# Patient Record
Sex: FEMALE | Race: WHITE | Employment: OTHER | URBAN - METROPOLITAN AREA
[De-identification: names, ages, dates, MRNs, and addresses within clinical notes are randomized per-mention and may not be internally consistent; named-entity substitution may affect disease eponyms.]

---

## 2017-01-01 ENCOUNTER — TELEPHONE (OUTPATIENT)
Dept: FAMILY MEDICINE | Facility: CLINIC | Age: 82
End: 2017-01-01

## 2017-01-01 ENCOUNTER — TRANSFERRED RECORDS (OUTPATIENT)
Dept: HEALTH INFORMATION MANAGEMENT | Facility: CLINIC | Age: 82
End: 2017-01-01

## 2017-01-01 ENCOUNTER — ANTICOAGULATION THERAPY VISIT (OUTPATIENT)
Dept: FAMILY MEDICINE | Facility: CLINIC | Age: 82
End: 2017-01-01

## 2017-01-01 DIAGNOSIS — D68.9 COAGULATION DEFECT (H): Primary | ICD-10-CM

## 2017-01-01 DIAGNOSIS — I74.9 EMBOLISM AND THROMBOSIS (H): ICD-10-CM

## 2017-01-01 DIAGNOSIS — G30.9 ALZHEIMER'S DEMENTIA WITHOUT BEHAVIORAL DISTURBANCE, UNSPECIFIED TIMING OF DEMENTIA ONSET: Primary | ICD-10-CM

## 2017-01-01 DIAGNOSIS — F02.80 ALZHEIMER'S DEMENTIA WITHOUT BEHAVIORAL DISTURBANCE, UNSPECIFIED TIMING OF DEMENTIA ONSET: Primary | ICD-10-CM

## 2017-01-01 DIAGNOSIS — G93.40 ENCEPHALOPATHY ACUTE: ICD-10-CM

## 2017-01-01 DIAGNOSIS — Z79.01 LONG-TERM (CURRENT) USE OF ANTICOAGULANTS: ICD-10-CM

## 2017-01-01 RX ORDER — TRAZODONE HYDROCHLORIDE 50 MG/1
50 TABLET, FILM COATED ORAL
Qty: 45 TABLET | Refills: 11 | Status: SHIPPED | OUTPATIENT
Start: 2017-01-01

## 2017-01-01 RX ORDER — DULOXETIN HYDROCHLORIDE 20 MG/1
20 CAPSULE, DELAYED RELEASE ORAL
Qty: 30 CAPSULE | Refills: 11 | Status: SHIPPED | OUTPATIENT
Start: 2017-01-01

## 2017-01-02 NOTE — TELEPHONE ENCOUNTER
Both medications are historical in chart (not clear in chart who original prescriber was)    Pending Prescriptions:                       Disp   Refills    traZODone (DESYREL) 50 MG tablet          45 tab*11           Sig: Take 1 tablet (50 mg) by mouth nightly as needed           for sleep (may repeat x 1 per night if scheduled           dose inadequate.  must give before 3am)    DULoxetine (CYMBALTA) 20 MG EC capsule    30 cap*11           Sig: Take 1 capsule (20 mg) by mouth daily (with           dinner)           Last Written Prescription Date: historical  Last Fill Quantity: -; # refills: -  Last Office Visit with [facility], [facility] or [facility] prescribing provider:  11-22-16        Last PHQ-9 score on record= No flowsheet data found.    AST       32   11/22/2016  ALT       49   11/22/2016    RT Ramona (R)

## 2017-01-09 NOTE — TELEPHONE ENCOUNTER
Reason for Call:  Home Health Care    Chaya with [facility] called regarding (reason for call): Orders    Orders are needed for this patient. OT, PT and Skilled Nursing    PT: To Evaluate and Treat    OT: To Evaluate and Treat    Skilled Nursing: To Evaluate and Treat  The patient was discharged from [facility] 1/6/2017 without Homecare orders    Phone Number Homecare Nurse can be reached at: [phone]    Can we leave a detailed message on this number? YES    Phone number patient can be reached at: Home number on file [phone] (home)    Best Time: anytime    Call taken on 1/9/2017 at 3:13 PM by Lucia Enriquez

## 2017-01-09 NOTE — TELEPHONE ENCOUNTER
Spoke with patients daughter and she is going to call back and setup f/u appt she just wanted to know what it was for and I let her know it was a Hospital F/U - she will setup pham Levine- MARIO